# Patient Record
Sex: FEMALE | Race: WHITE | NOT HISPANIC OR LATINO | URBAN - METROPOLITAN AREA
[De-identification: names, ages, dates, MRNs, and addresses within clinical notes are randomized per-mention and may not be internally consistent; named-entity substitution may affect disease eponyms.]

---

## 2021-12-07 ENCOUNTER — EMERGENCY (EMERGENCY)
Facility: HOSPITAL | Age: 51
LOS: 1 days | Discharge: HOME | End: 2021-12-07
Admitting: EMERGENCY MEDICINE
Payer: COMMERCIAL

## 2021-12-07 VITALS
HEART RATE: 88 BPM | RESPIRATION RATE: 17 BRPM | HEIGHT: 63 IN | DIASTOLIC BLOOD PRESSURE: 76 MMHG | SYSTOLIC BLOOD PRESSURE: 116 MMHG | OXYGEN SATURATION: 98 % | TEMPERATURE: 99 F | WEIGHT: 119.93 LBS

## 2021-12-07 VITALS
TEMPERATURE: 98 F | HEART RATE: 89 BPM | SYSTOLIC BLOOD PRESSURE: 120 MMHG | DIASTOLIC BLOOD PRESSURE: 73 MMHG | OXYGEN SATURATION: 98 % | RESPIRATION RATE: 16 BRPM

## 2021-12-07 DIAGNOSIS — M54.2 CERVICALGIA: ICD-10-CM

## 2021-12-07 DIAGNOSIS — R55 SYNCOPE AND COLLAPSE: ICD-10-CM
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
## 2021-12-07 DIAGNOSIS — M54.9 DORSALGIA, UNSPECIFIED: ICD-10-CM

## 2021-12-07 DIAGNOSIS — S09.90XA UNSPECIFIED INJURY OF HEAD, INITIAL ENCOUNTER: ICD-10-CM
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
## 2021-12-07 DIAGNOSIS — R51.9 HEADACHE, UNSPECIFIED: ICD-10-CM

## 2021-12-07 LAB
ALBUMIN SERPL ELPH-MCNC: 3.5 G/DL — SIGNIFICANT CHANGE UP (ref 3.4–5)
ALP SERPL-CCNC: 52 U/L — SIGNIFICANT CHANGE UP (ref 40–120)
ALT FLD-CCNC: 20 U/L — SIGNIFICANT CHANGE UP (ref 12–42)
AMPHET UR-MCNC: NEGATIVE — SIGNIFICANT CHANGE UP
ANION GAP SERPL CALC-SCNC: 5 MMOL/L — LOW (ref 9–16)
APPEARANCE UR: CLEAR — SIGNIFICANT CHANGE UP
APTT BLD: 34.5 SEC — SIGNIFICANT CHANGE UP (ref 27.5–35.5)
AST SERPL-CCNC: 23 U/L — SIGNIFICANT CHANGE UP (ref 15–37)
BARBITURATES UR SCN-MCNC: NEGATIVE — SIGNIFICANT CHANGE UP
BASOPHILS # BLD AUTO: 0.06 K/UL — SIGNIFICANT CHANGE UP (ref 0–0.2)
BASOPHILS NFR BLD AUTO: 0.9 % — SIGNIFICANT CHANGE UP (ref 0–2)
BENZODIAZ UR-MCNC: NEGATIVE — SIGNIFICANT CHANGE UP
BILIRUB SERPL-MCNC: 0.7 MG/DL — SIGNIFICANT CHANGE UP (ref 0.2–1.2)
BILIRUB UR-MCNC: NEGATIVE — SIGNIFICANT CHANGE UP
BUN SERPL-MCNC: 15 MG/DL — SIGNIFICANT CHANGE UP (ref 7–23)
CALCIUM SERPL-MCNC: 9.3 MG/DL — SIGNIFICANT CHANGE UP (ref 8.5–10.5)
CHLORIDE SERPL-SCNC: 107 MMOL/L — SIGNIFICANT CHANGE UP (ref 96–108)
CO2 SERPL-SCNC: 27 MMOL/L — SIGNIFICANT CHANGE UP (ref 22–31)
COCAINE METAB.OTHER UR-MCNC: NEGATIVE — SIGNIFICANT CHANGE UP
COLOR SPEC: YELLOW — SIGNIFICANT CHANGE UP
CREAT SERPL-MCNC: 1.05 MG/DL — SIGNIFICANT CHANGE UP (ref 0.5–1.3)
DIFF PNL FLD: NEGATIVE — SIGNIFICANT CHANGE UP
EOSINOPHIL # BLD AUTO: 0.08 K/UL — SIGNIFICANT CHANGE UP (ref 0–0.5)
EOSINOPHIL NFR BLD AUTO: 1.2 % — SIGNIFICANT CHANGE UP (ref 0–6)
ETHANOL SERPL-MCNC: <3 MG/DL — SIGNIFICANT CHANGE UP
GLUCOSE SERPL-MCNC: 101 MG/DL — HIGH (ref 70–99)
GLUCOSE UR QL: NEGATIVE — SIGNIFICANT CHANGE UP
HCG SERPL-ACNC: 1 MIU/ML — SIGNIFICANT CHANGE UP
HCT VFR BLD CALC: 41.9 % — SIGNIFICANT CHANGE UP (ref 34.5–45)
HGB BLD-MCNC: 14.8 G/DL — SIGNIFICANT CHANGE UP (ref 11.5–15.5)
IMM GRANULOCYTES NFR BLD AUTO: 0.3 % — SIGNIFICANT CHANGE UP (ref 0–1.5)
INR BLD: 1.02 — SIGNIFICANT CHANGE UP (ref 0.88–1.16)
KETONES UR-MCNC: NEGATIVE — SIGNIFICANT CHANGE UP
LEUKOCYTE ESTERASE UR-ACNC: NEGATIVE — SIGNIFICANT CHANGE UP
LYMPHOCYTES # BLD AUTO: 2.08 K/UL — SIGNIFICANT CHANGE UP (ref 1–3.3)
LYMPHOCYTES # BLD AUTO: 32.4 % — SIGNIFICANT CHANGE UP (ref 13–44)
MAGNESIUM SERPL-MCNC: 2.1 MG/DL — SIGNIFICANT CHANGE UP (ref 1.6–2.6)
MCHC RBC-ENTMCNC: 31.5 PG — SIGNIFICANT CHANGE UP (ref 27–34)
MCHC RBC-ENTMCNC: 35.3 GM/DL — SIGNIFICANT CHANGE UP (ref 32–36)
MCV RBC AUTO: 89.1 FL — SIGNIFICANT CHANGE UP (ref 80–100)
METHADONE UR-MCNC: NEGATIVE — SIGNIFICANT CHANGE UP
MONOCYTES # BLD AUTO: 0.5 K/UL — SIGNIFICANT CHANGE UP (ref 0–0.9)
MONOCYTES NFR BLD AUTO: 7.8 % — SIGNIFICANT CHANGE UP (ref 2–14)
NEUTROPHILS # BLD AUTO: 3.67 K/UL — SIGNIFICANT CHANGE UP (ref 1.8–7.4)
NEUTROPHILS NFR BLD AUTO: 57.4 % — SIGNIFICANT CHANGE UP (ref 43–77)
NITRITE UR-MCNC: NEGATIVE — SIGNIFICANT CHANGE UP
NRBC # BLD: 0 /100 WBCS — SIGNIFICANT CHANGE UP (ref 0–0)
OPIATES UR-MCNC: NEGATIVE — SIGNIFICANT CHANGE UP
PCP SPEC-MCNC: SIGNIFICANT CHANGE UP
PCP UR-MCNC: NEGATIVE — SIGNIFICANT CHANGE UP
PH UR: 7 — SIGNIFICANT CHANGE UP (ref 5–8)
PLATELET # BLD AUTO: 264 K/UL — SIGNIFICANT CHANGE UP (ref 150–400)
POTASSIUM SERPL-MCNC: 4.2 MMOL/L — SIGNIFICANT CHANGE UP (ref 3.5–5.3)
POTASSIUM SERPL-SCNC: 4.2 MMOL/L — SIGNIFICANT CHANGE UP (ref 3.5–5.3)
PROT SERPL-MCNC: 6.7 G/DL — SIGNIFICANT CHANGE UP (ref 6.4–8.2)
PROT UR-MCNC: NEGATIVE MG/DL — SIGNIFICANT CHANGE UP
PROTHROM AB SERPL-ACNC: 12 SEC — SIGNIFICANT CHANGE UP (ref 10.6–13.6)
RBC # BLD: 4.7 M/UL — SIGNIFICANT CHANGE UP (ref 3.8–5.2)
RBC # FLD: 12.5 % — SIGNIFICANT CHANGE UP (ref 10.3–14.5)
SODIUM SERPL-SCNC: 139 MMOL/L — SIGNIFICANT CHANGE UP (ref 132–145)
SP GR SPEC: 1.02 — SIGNIFICANT CHANGE UP (ref 1–1.03)
THC UR QL: NEGATIVE — SIGNIFICANT CHANGE UP
TROPONIN I, HIGH SENSITIVITY RESULT: 5.8 NG/L — SIGNIFICANT CHANGE UP
UROBILINOGEN FLD QL: 0.2 E.U./DL — SIGNIFICANT CHANGE UP
WBC # BLD: 6.41 K/UL — SIGNIFICANT CHANGE UP (ref 3.8–10.5)
WBC # FLD AUTO: 6.41 K/UL — SIGNIFICANT CHANGE UP (ref 3.8–10.5)

## 2021-12-07 PROCEDURE — 93010 ELECTROCARDIOGRAM REPORT: CPT

## 2021-12-07 PROCEDURE — 70450 CT HEAD/BRAIN W/O DYE: CPT | Mod: 26

## 2021-12-07 PROCEDURE — 99285 EMERGENCY DEPT VISIT HI MDM: CPT | Mod: 25

## 2021-12-07 PROCEDURE — 72125 CT NECK SPINE W/O DYE: CPT | Mod: 26

## 2021-12-07 RX ORDER — SODIUM CHLORIDE 9 MG/ML
1000 INJECTION INTRAMUSCULAR; INTRAVENOUS; SUBCUTANEOUS ONCE
Refills: 0 | Status: COMPLETED | OUTPATIENT
Start: 2021-12-07 | End: 2021-12-07

## 2021-12-07 RX ORDER — METOCLOPRAMIDE HCL 10 MG
10 TABLET ORAL ONCE
Refills: 0 | Status: COMPLETED | OUTPATIENT
Start: 2021-12-07 | End: 2021-12-07

## 2021-12-07 RX ADMIN — SODIUM CHLORIDE 1000 MILLILITER(S): 9 INJECTION INTRAMUSCULAR; INTRAVENOUS; SUBCUTANEOUS at 12:48

## 2021-12-07 RX ADMIN — SODIUM CHLORIDE 1000 MILLILITER(S): 9 INJECTION INTRAMUSCULAR; INTRAVENOUS; SUBCUTANEOUS at 12:55

## 2021-12-07 NOTE — ED ADULT TRIAGE NOTE - CHIEF COMPLAINT QUOTE
syncopal episode last night +loc  c/o headache and nausea. headache relieved with ibuprofen pta. "I went out to walk my dog and as I was waiting for my friend I told her I wasn't feeling well and I was on the ground." upon onset of symptoms states that she was diaphoretic. denies any chest pain/sob. currently aox3 verbal, ambulatory with steady gait. yu. denies any cardiac hx.

## 2021-12-07 NOTE — ED PROVIDER NOTE - PATIENT PORTAL LINK FT
You can access the FollowMyHealth Patient Portal offered by [facility] by registering at the following website: [email or web address]. By joining [employer]’s FollowMyHealth portal, you will also be able to view your health information using other applications (apps) compatible with our system.

## 2021-12-07 NOTE — ED PROVIDER NOTE - OBJECTIVE STATEMENT
52 y/o female with PMHx of migraines (on no medication), presents to the ED s/p syncopal episode yesterday. Patient states that she was hosting a friend last night at her house and was about to go out ot walk her dog, and when she stepped outside she became dizzy and passed out just outside her house. Patient states her friend witnessed the event and helped her into the house. Patient states the event lasted "seconds", and when she came to, she heard her friend's voice before regaining vision. Patient states that after the event the dizziness completely resolved but she had a headache, so she took an Advil and waited 1 hour before going to bed out of concern that sleeping soon after a concussion could be problematic. Patient states she woke up in the middle of the night with a headache, neck pain and nausea but with no vomiting. Today, patient states the headache is still present, and she feels slightly dizzy and "a little off". Patiet denies chest pain, shortness of breath, cough, palpitations, fever or chills. She denies change in appetite, adding that she "doesn't eat a lot normally." Patient states that her home is hot and dry because of the heater in the winter time, and she states she may not drink enough water because she has a demanding job. Patient has FMHx of cardiac issues: her father had an emergency tripel bypass just before he turned 50 years old. Patient states this is the first time she has had a syncopal episode, but recalls one event in the past where she felt she nearly passed out after taking a hot shower. 52 y/o female with PMHx of migraines (on no medication), presents to the ED s/p syncopal episode yesterday. Patient states that she was hosting a friend last night at her house and was about to go out ot walk her dog, and when she stepped outside she became dizzy and passed out just outside her house. Patient states her friend witnessed the event and helped her into the house. Patient states the event lasted "seconds", and when she came to, she heard her friend's voice before regaining vision. Patient states that after the event the dizziness completely resolved but she had a headache, so she took an Advil and waited 1 hour before going to bed out of concern that sleeping soon after a concussion could be problematic. Patient states she woke up in the middle of the night with a headache, neck pain and nausea but with no vomiting. Today, patient states the headache is still present, and she feels slightly dizzy and "a little off". Patient denies chest pain, shortness of breath, cough, palpitations, fever or chills. She denies change in appetite, adding that she "doesn't eat a lot normally." Patient states that her home is hot and dry because of the heater in the winter time, and she states she may not drink enough water because she has a demanding job. Patient has FMHx of cardiac issues: her father had an emergency tripel bypass just before he turned 50 years old. Patient states this is the first time she has had a syncopal episode, but recalls one event in the past where she felt she nearly passed out after taking a hot shower. Patient is vaccinated against COVID-19 with booster shot. Patient states she has regular menstrual periods, and her LMP ended 8 days ago. 50 y/o female with PMHx of migraines (on no medication), presents to the ED s/p syncopal episode yesterday. Patient states that she was hosting a friend last night at her house and was about to go out ot walk her dog, and when she stepped outside she became dizzy and passed out just outside her house. Patient states her friend witnessed the event and helped her into the house. Patient states the event lasted "seconds".  When she came to, she heard her friend's voice before regaining vision. After the event, the dizziness completely resolved but she had a generalized headache.  She took an Advil and waited 1 hour before going to bed.  She noted a headache, neck pain and nausea but with no vomiting in the middle of the night. Today, patient states the headache is still present but less intense with associated dizziness dizzy and feels "a little off". She also c/o upper back /neck pain.    Patient denies chest pain, shortness of breath, cough, palpitations, abd pain, n/v/d/ fever or chills, recent illness, numbness/weakness, visual changes.   Pt admits to poor eating habits and decreased PO intake.  Patient has FMHx of "cardiac issues": her father had an emergency tripel bypass just before he turned 50 years old. Patient states this is the first time she has had a syncopal episode, but recalls one event in the past where she felt she nearly passed out after taking a hot shower. Patient is vaccinated against COVID-19 with booster shot. Patient states she has regular menstrual periods, and her LMP ended 8 days ago.

## 2021-12-07 NOTE — ED PROVIDER NOTE - ENMT, MLM
Airway patent, NCAT Airway patent, Nasal mucosa clear. Mouth with normal mucosa. Throat has no vesicles, no oropharyngeal exudates and uvula is midline.

## 2021-12-07 NOTE — ED ADULT NURSE NOTE - NSIMPLEMENTINTERV_GEN_ALL_ED
Implemented All Fall Risk Interventions:  [place] to call system. Call bell, personal items and telephone within reach. Instruct patient to call for assistance. Room bathroom lighting operational. Non-slip footwear when patient is off stretcher. Physically safe environment: no spills, clutter or unnecessary equipment. Stretcher in lowest position, wheels locked, appropriate side rails in place. Provide visual cue, wrist band, yellow gown, etc. Monitor gait and stability. Monitor for mental status changes and reorient to person, place, and time. Review medications for side effects contributing to fall risk. Reinforce activity limits and safety measures with patient and family.

## 2021-12-07 NOTE — ED ADULT NURSE NOTE - OBJECTIVE STATEMENT
52 y/o female who states she passed out last night at 10pm and hit her head on the terra cotta floor- Pt reports slight pain to back of head and lateral neck pain- Pt admits to only having 1 alcoholic drink last night- but is concerned for concussion

## 2021-12-07 NOTE — ED PROVIDER NOTE - NSFOLLOWUPINSTRUCTIONS_ED_ALL_ED_FT
HEAD INJURY    A head injury can include your scalp, face, skull, or brain and range from mild to severe. Effects can appear immediately after the injury or develop later. The effects may last a short time or be permanent. Healthcare providers may want to check your recovery over time. Treatment may change as you recover or develop new health problems from the head injury.    DISCHARGE INSTRUCTIONS:    Call your local emergency number (911 in the [place]), or have someone else call if:   •You cannot be woken.  •You have a seizure.  •You stop responding to others or you faint.  •You have blurry or double vision.  •Your speech becomes slurred or confused.  •You have arm or leg weakness, loss of feeling, or new problems with coordination.  •Your pupils are larger than usual, or one pupil is a different size than the other.  •You have blood or clear fluid coming out of your ears or nose.      Return to the emergency department if:   •You have repeated or forceful vomiting.  •You feel confused.  •Your headache gets worse or becomes severe.  •You or someone caring for you notices that you are harder to wake than usual.      Call your doctor if:   •Your symptoms last longer than 6 weeks after the injury.  •You have questions or concerns about your condition or care.      Medicines:   •Acetaminophen decreases pain and fever. It is available without a doctor's order. Ask how much to take and how often to take it. Follow directions. Read the labels of all other medicines you are using to see if they also contain acetaminophen, or ask your doctor or pharmacist. Acetaminophen can cause liver damage if not taken correctly. Do not use more than 4 grams (4,000 milligrams) total of acetaminophen in one day.       •Take your medicine as directed. Contact your healthcare provider if you think your medicine is not helping or if you have side effects. Tell him or her if you are allergic to any medicine. Keep a list of the medicines, vitamins, and herbs you take. Include the amounts, and when and why you take them. Bring the list or the pill bottles to follow-up visits. Carry your medicine list with you in case of an emergency.      Self-care:   •Rest or do quiet activities. Limit your time watching TV, using the computer, or doing tasks that require a lot of thinking. Slowly return to your normal activities as directed. Do not play sports or do activities that may cause you to get hit in the head. Ask your healthcare provider when you can return to sports.      •Apply ice on your head for 15 to 20 minutes every hour or as directed. Use an ice pack, or put crushed ice in a plastic bag. Cover it with a towel before you apply it to your skin. Ice helps prevent tissue damage and decreases swelling and pain.      •Have someone stay with you for 24 hours , or as directed. This person can monitor you for problems and call for help if needed. When you are awake, the person should ask you a few questions every few hours to see if you are thinking clearly. An example is to ask your name or address.      Prevent another head injury:   •Wear a helmet that fits properly. Do this when you play sports, or ride a bike, scooter, or skateboard. Helmets help decrease your risk for a serious head injury. Talk to your healthcare provider about other ways you can protect yourself if you play sports.      •Wear your seatbelt every time you are in a car. This helps lower your risk for a head injury if you are in a car accident.    Follow up with your doctor as directed: Write down your questions so you remember to ask them during your visits.      Syncope    Syncope is when you temporarily lose consciousness, also called fainting or passing out. It is caused by a sudden decrease in blood flow to the brain. Even though most causes of syncope are not dangerous, syncope can possibly be a sign of a serious medical problem. Signs that you may be about to faint include feeling dizzy, lightheaded, nausea, visual changes, or cold/clammy skin. Do not drive, operate heavy machinery, or play sports until your health care provider says it is okay.    SEEK IMMEDIATE MEDICAL CARE IF YOU HAVE ANY OF THE FOLLOWING SYMPTOMS: severe headache, pain in your chest/abdomen/back, bleeding from your mouth or rectum, palpitations, shortness of breath, pain with breathing, seizure, confusion, or trouble walking.

## 2021-12-07 NOTE — ED PROVIDER NOTE - NEUROLOGICAL, MLM
Alert and oriented, no focal deficits. Patient is alert, oriented x person, place and time.  Cranial nerves 2-12 are intact.  Normal gait and speech.  Cerebellar testing normal:  negative Romberg, normal coordination and normal finger to nose, heal to shin and rapid alternating movements.  Normal proprioception and sensory exam.  No pronator drift.  5/5 bl upper extremity and lower extremity strength.

## 2021-12-07 NOTE — ED PROVIDER NOTE - CLINICAL SUMMARY MEDICAL DECISION MAKING FREE TEXT BOX
52 y/o female with PMHx of migraines presents to the ED s/p syncopal episode yesterday evening. Patient currently presents with lingering headache, neck pain and dizziness. Will get CT head to rule out bleeding. 52 y/o female with PMHx of migraines presents to the ED with c/o of generalized headache and neck/ upper back pain pain s/p syncopal  and fall yesterday.  Pt well appearing, VSS, NAD.  Neuros intact.  CT head and cervical spine negative.  Pt hydrated with IVFs and given Reglan IV.      Results and diagnosis discussed with patient.  Treatment plan discussed.  Return precautions discussed.  Pt verbalized understanding and given the opportunity to ask questions.  Patient advised to follow up with primary care provider and cardiology.

## 2021-12-07 NOTE — ED ADULT NURSE NOTE - NS ED PATIENT SAFETY CONCERN
What Type Of Note Output Would You Prefer (Optional)?: Standard Output
How Severe Is Your Rash?: mild
Is This A New Presentation, Or A Follow-Up?: Rash
No

## 2021-12-07 NOTE — ED PROVIDER NOTE - CARE PROVIDER_API CALL
Efrain Magaña)  Cardiovascular Disease  [address] [facility] [place] [address]  Phone: [phone]  Fax: [phone]  Follow Up Time:

## 2022-04-10 ENCOUNTER — EMERGENCY (EMERGENCY)
Facility: HOSPITAL | Age: 52
LOS: 1 days | Discharge: HOME | End: 2022-04-10
Admitting: EMERGENCY MEDICINE
Payer: COMMERCIAL

## 2022-04-10 VITALS
SYSTOLIC BLOOD PRESSURE: 92 MMHG | HEART RATE: 95 BPM | OXYGEN SATURATION: 98 % | TEMPERATURE: 98 F | DIASTOLIC BLOOD PRESSURE: 52 MMHG | WEIGHT: 125 LBS | RESPIRATION RATE: 18 BRPM | HEIGHT: 63 IN

## 2022-04-10 LAB
ALBUMIN SERPL ELPH-MCNC: 3.5 G/DL — SIGNIFICANT CHANGE UP (ref 3.4–5)
ALP SERPL-CCNC: 55 U/L — SIGNIFICANT CHANGE UP (ref 40–120)
ALT FLD-CCNC: 21 U/L — SIGNIFICANT CHANGE UP (ref 12–42)
ANION GAP SERPL CALC-SCNC: 8 MMOL/L — LOW (ref 9–16)
AST SERPL-CCNC: 24 U/L — SIGNIFICANT CHANGE UP (ref 15–37)
BASOPHILS # BLD AUTO: 0.05 K/UL — SIGNIFICANT CHANGE UP (ref 0–0.2)
BASOPHILS NFR BLD AUTO: 0.4 % — SIGNIFICANT CHANGE UP (ref 0–2)
BILIRUB SERPL-MCNC: 0.6 MG/DL — SIGNIFICANT CHANGE UP (ref 0.2–1.2)
BUN SERPL-MCNC: 20 MG/DL — SIGNIFICANT CHANGE UP (ref 7–23)
CALCIUM SERPL-MCNC: 9 MG/DL — SIGNIFICANT CHANGE UP (ref 8.5–10.5)
CHLORIDE SERPL-SCNC: 104 MMOL/L — SIGNIFICANT CHANGE UP (ref 96–108)
CO2 SERPL-SCNC: 27 MMOL/L — SIGNIFICANT CHANGE UP (ref 22–31)
CREAT SERPL-MCNC: 0.97 MG/DL — SIGNIFICANT CHANGE UP (ref 0.5–1.3)
EGFR: 71 ML/MIN/1.73M2 — SIGNIFICANT CHANGE UP
EOSINOPHIL # BLD AUTO: 0.03 K/UL — SIGNIFICANT CHANGE UP (ref 0–0.5)
EOSINOPHIL NFR BLD AUTO: 0.3 % — SIGNIFICANT CHANGE UP (ref 0–6)
GLUCOSE SERPL-MCNC: 120 MG/DL — HIGH (ref 70–99)
HCT VFR BLD CALC: 41.3 % — SIGNIFICANT CHANGE UP (ref 34.5–45)
HGB BLD-MCNC: 14.4 G/DL — SIGNIFICANT CHANGE UP (ref 11.5–15.5)
IMM GRANULOCYTES NFR BLD AUTO: 0.4 % — SIGNIFICANT CHANGE UP (ref 0–1.5)
LYMPHOCYTES # BLD AUTO: 0.94 K/UL — LOW (ref 1–3.3)
LYMPHOCYTES # BLD AUTO: 8.4 % — LOW (ref 13–44)
MCHC RBC-ENTMCNC: 31.5 PG — SIGNIFICANT CHANGE UP (ref 27–34)
MCHC RBC-ENTMCNC: 34.9 GM/DL — SIGNIFICANT CHANGE UP (ref 32–36)
MCV RBC AUTO: 90.4 FL — SIGNIFICANT CHANGE UP (ref 80–100)
MONOCYTES # BLD AUTO: 0.77 K/UL — SIGNIFICANT CHANGE UP (ref 0–0.9)
MONOCYTES NFR BLD AUTO: 6.9 % — SIGNIFICANT CHANGE UP (ref 2–14)
NEUTROPHILS # BLD AUTO: 9.33 K/UL — HIGH (ref 1.8–7.4)
NEUTROPHILS NFR BLD AUTO: 83.6 % — HIGH (ref 43–77)
NRBC # BLD: 0 /100 WBCS — SIGNIFICANT CHANGE UP (ref 0–0)
NT-PROBNP SERPL-SCNC: 29 PG/ML — SIGNIFICANT CHANGE UP
PLATELET # BLD AUTO: 226 K/UL — SIGNIFICANT CHANGE UP (ref 150–400)
POTASSIUM SERPL-MCNC: 3.9 MMOL/L — SIGNIFICANT CHANGE UP (ref 3.5–5.3)
POTASSIUM SERPL-SCNC: 3.9 MMOL/L — SIGNIFICANT CHANGE UP (ref 3.5–5.3)
PROT SERPL-MCNC: 6.4 G/DL — SIGNIFICANT CHANGE UP (ref 6.4–8.2)
RBC # BLD: 4.57 M/UL — SIGNIFICANT CHANGE UP (ref 3.8–5.2)
RBC # FLD: 12.7 % — SIGNIFICANT CHANGE UP (ref 10.3–14.5)
SODIUM SERPL-SCNC: 139 MMOL/L — SIGNIFICANT CHANGE UP (ref 132–145)
TROPONIN I, HIGH SENSITIVITY RESULT: 6.2 NG/L — SIGNIFICANT CHANGE UP
WBC # BLD: 11.16 K/UL — HIGH (ref 3.8–10.5)
WBC # FLD AUTO: 11.16 K/UL — HIGH (ref 3.8–10.5)

## 2022-04-10 PROCEDURE — 99284 EMERGENCY DEPT VISIT MOD MDM: CPT | Mod: 25

## 2022-04-10 PROCEDURE — 12011 RPR F/E/E/N/L/M 2.5 CM/<: CPT

## 2022-04-10 PROCEDURE — 93010 ELECTROCARDIOGRAM REPORT: CPT | Mod: 59

## 2022-04-10 RX ORDER — SODIUM CHLORIDE 9 MG/ML
1000 INJECTION INTRAMUSCULAR; INTRAVENOUS; SUBCUTANEOUS ONCE
Refills: 0 | Status: COMPLETED | OUTPATIENT
Start: 2022-04-10 | End: 2022-04-10

## 2022-04-10 RX ADMIN — SODIUM CHLORIDE 1000 MILLILITER(S): 9 INJECTION INTRAMUSCULAR; INTRAVENOUS; SUBCUTANEOUS at 22:53

## 2022-04-10 NOTE — ED ADULT NURSE NOTE - OBJECTIVE STATEMENT
Pt presents to ed for syncope episode tonight. went for a run this morning, doesn't believe she drank enough water, and came back to apt. felt light headed, sat down and it was resolved. pt felt better so got into elevator, and felt lightheaded again and proceeded to syncopize. + LOC for a few seconds, woke up with hematoma to left forehead and small laceration to left forehead  bleeding controlled

## 2022-04-10 NOTE — ED ADULT TRIAGE NOTE - CHIEF COMPLAINT QUOTE
Pt. walk in. Pt. fainted and hit head. "I think I'm dehydrated, I went for a hike today and I drank a lot of water but not enough. I fainted in the elevator after walking my dog." Pt. has laceration to L. forehead. Denies blood thinners.

## 2022-04-11 VITALS
TEMPERATURE: 99 F | OXYGEN SATURATION: 99 % | SYSTOLIC BLOOD PRESSURE: 98 MMHG | RESPIRATION RATE: 18 BRPM | HEART RATE: 102 BPM | DIASTOLIC BLOOD PRESSURE: 56 MMHG

## 2022-04-11 PROBLEM — G43.909 MIGRAINE, UNSPECIFIED, NOT INTRACTABLE, WITHOUT STATUS MIGRAINOSUS: Chronic | Status: ACTIVE | Noted: 2021-12-07

## 2022-04-11 LAB
APPEARANCE UR: CLEAR — SIGNIFICANT CHANGE UP
BACTERIA # UR AUTO: ABNORMAL /HPF
BILIRUB UR-MCNC: NEGATIVE — SIGNIFICANT CHANGE UP
COLOR SPEC: YELLOW — SIGNIFICANT CHANGE UP
COMMENT - URINE: SIGNIFICANT CHANGE UP
DIFF PNL FLD: ABNORMAL
EPI CELLS # UR: ABNORMAL /HPF (ref 0–5)
GLUCOSE UR QL: NEGATIVE — SIGNIFICANT CHANGE UP
KETONES UR-MCNC: 15 MG/DL
LEUKOCYTE ESTERASE UR-ACNC: ABNORMAL
NITRITE UR-MCNC: NEGATIVE — SIGNIFICANT CHANGE UP
PH UR: 7 — SIGNIFICANT CHANGE UP (ref 5–8)
PROT UR-MCNC: NEGATIVE MG/DL — SIGNIFICANT CHANGE UP
SP GR SPEC: 1.02 — SIGNIFICANT CHANGE UP (ref 1–1.03)
UROBILINOGEN FLD QL: 0.2 E.U./DL — SIGNIFICANT CHANGE UP
WBC UR QL: > 10 /HPF

## 2022-04-11 RX ORDER — CEFUROXIME AXETIL 250 MG
1 TABLET ORAL
Qty: 10 | Refills: 0
Start: 2022-04-11 | End: 2022-04-15

## 2022-04-11 RX ORDER — CEFUROXIME AXETIL 250 MG
250 TABLET ORAL ONCE
Refills: 0 | Status: COMPLETED | OUTPATIENT
Start: 2022-04-11 | End: 2022-04-11

## 2022-04-11 RX ADMIN — Medication 250 MILLIGRAM(S): at 00:30

## 2022-04-11 NOTE — ED PROVIDER NOTE - MUSCULOSKELETAL, MLM
Spine appears normal with no midline tenderness or stepoff deformity, range of motion is not limited, no muscle or joint tenderness

## 2022-04-11 NOTE — ED PROVIDER NOTE - OBJECTIVE STATEMENT
50yo F presents with c/o syncope. states she was out hiking all day and does not think she hydrated enough, then she was walking her dog and when she came back she felt lightheaded and experienced tunnel vision. she sat down and then passed out, striking her forehead. she describes mild headache. no n/v, numbness, tingling, weakness, dizziness, neck or back pain, vision changes, speech changes, cp, sob, palpitations. one similar episode approx 6 months ago.

## 2022-04-11 NOTE — ED PROVIDER NOTE - PATIENT PORTAL LINK FT
You can access the FollowMyHealth Patient Portal offered by [facility] by registering at the following website: [email or web address]. By joining [employer]’s FollowMyHealth portal, you will also be able to view your health information using other applications (apps) compatible with our system.

## 2022-04-11 NOTE — ED PROVIDER NOTE - CLINICAL SUMMARY MEDICAL DECISION MAKING FREE TEXT BOX
pt presents with syncope with facial lac in the setting of suspected dehyration. not orthostatic but mildly hypotensive with ketonuria. given IVF with improvement. cardiac enzymes negative. UA with UTI. given abx. will d/c.

## 2022-04-11 NOTE — ED PROVIDER NOTE - ENMT, MLM
Airway patent, Nasal mucosa clear. Mouth with normal mucosa. Throat has no vesicles, no oropharyngeal exudates and uvula is midline. + linear laceration to forehead with minimal subQ fat visible and surrounding hematoma

## 2022-04-11 NOTE — ED PROVIDER NOTE - NSFOLLOWUPINSTRUCTIONS_ED_ALL_ED_FT
DRINK PLENTY OF FLUIDS FOR THE NEXT 48 HOURS ESPECIALLY.     TAKE ANTIBIOTICS AS PRESCRIBED FOR UTI.     FOLLOW UP WITH CARDIOLOGIST AS THIS IS NOW THE SECOND TIME YOU HAVE PASSED OUT.     KEEP WOUND CLEAN AND DRY FOR 24 HOURS. AFTER THAT OKAY TO SHOWER NORMALLY BUT DO NOT SUBMERGE OR SCRUB THE WOUND. CHANGE DRESSING EVERY 1-2 DAYS.     RETURN FOR SUTURE REMOVAL IN 5-7 DAYS.     Urinary Tract Infection    A urinary tract infection (UTI) is an infection of any part of the urinary tract, which includes the kidneys, ureters, bladder, and urethra. Risk factors include ignoring your need to urinate, wiping back to front if female, being an uncircumcised male, and having diabetes or a weak immune system. Symptoms include frequent urination, pain or burning with urination, foul smelling urine, cloudy urine, pain in the lower abdomen, blood in the urine, and fever. If you were prescribed an antibiotic medicine, take it as told by your health care provider. Do not stop taking the antibiotic even if you start to feel better.    SEEK IMMEDIATE MEDICAL CARE IF YOU HAVE ANY OF THE FOLLOWING SYMPTOMS: severe back or abdominal pain, fever, inability to keep fluids or medicine down, dizziness/lightheadedness, or a change in mental status.     Syncope    WHAT YOU NEED TO KNOW:    Syncope is also called fainting or passing out. Syncope is a sudden, temporary loss of consciousness, followed by a fall from a standing or sitting position. Syncope ranges from not serious to a sign of a more serious condition that needs to be treated. You can control some health conditions that cause syncope. Your healthcare providers can help you create a plan to manage syncope and prevent episodes.    DISCHARGE INSTRUCTIONS:    Seek care immediately if:     You are bleeding because you hit your head when you fainted.       You suddenly have double vision, difficulty speaking, numbness, and cannot move your arms or legs.      You have chest pain and trouble breathing.      You vomit blood or material that looks like coffee grounds.      You see blood in your bowel movement.    Contact your healthcare provider if:     You have new or worsening symptoms.      You have another syncope episode.      You have a headache, fast heartbeat, or feel too dizzy to stand up.      You have questions or concerns about your condition or care.    Medicines:     Medicines may be needed to help your heart pump strongly and regularly. Your healthcare provider may also make changes to any medicines that are causing syncope.       Take your medicine as directed. Contact your healthcare provider if you think your medicine is not helping or if you have side effects. Tell him or her if you are allergic to any medicine. Keep a list of the medicines, vitamins, and herbs you take. Include the amounts, and when and why you take them. Bring the list or the pill bottles to follow-up visits. Carry your medicine list with you in case of an emergency.    Follow up with your healthcare provider as directed: Write down your questions so you remember to ask them during your visits.     Manage syncope:     Keep a record of your syncope episodes. Include your symptoms and your activity before and after the episode. The record can help your healthcare provider find the cause of your syncope and help you manage episodes.      Sit or lie down when needed. This includes when you feel dizzy, your throat is getting tight, and your vision changes. Raise your legs above the level of your heart.      Take slow, deep breaths if you start to breathe faster with anxiety or fear. This can help decrease dizziness and the feeling that you might faint.       Check your blood pressure often. This is important if you take medicine to lower your blood pressure. Check your blood pressure when you are lying down and when you are standing. Ask how often to check during the day. Keep a record of your blood pressure numbers. Your healthcare provider may use the record to help plan your treatment.How to take a Blood Pressure         Prevent a syncope episode:     Move slowly and let yourself get used to one position before you move to another position. This is very important when you change from a lying or sitting position to a standing position. Take some deep breaths before you stand up from a lying position. Stand up slowly. Sudden movements may cause a fainting spell. Sit on the side of the bed or couch for a few minutes before you stand up. If you are on bedrest, try to be upright for about 2 hours each day, or as directed. Do not lock your legs if you are standing for a long period of time. Move your legs and bend your knees to keep blood flowing.      Follow your healthcare provider's recommendations. Your provider may recommend that you drink more liquids to prevent dehydration. You may also need to have more salt to keep your blood pressure from dropping too low and causing syncope. Your provider will tell you how much liquid and sodium to have each day. He or she will also tell you how much physical activity is safe for you. This will depend on what is causing your syncope.      Watch for signs of low blood sugar. These include hunger, nervousness, sweating, and fast or fluttery heartbeats. Talk with your healthcare provider about ways to keep your blood sugar level steady.      Do not strain if you are constipated. You may faint if you strain to have a bowel movement. Walking is the best way to get your bowels moving. Eat foods high in fiber to make it easier to have a bowel movement. Good examples are high-fiber cereals, beans, vegetables, and whole-grain breads. Prune juice may help make bowel movements softer.      Be careful in hot weather. Heat can cause a syncope episode. Limit activity done outside on hot days. Physical activity in hot weather can lead to dehydration. This can cause an episode.    Laceration    A laceration is a cut that goes through all of the layers of the skin and into the tissue that is right under the skin. Some lacerations heal on their own. Others need to be closed with skin adhesive strips, skin glue, stitches (sutures), or staples. Proper laceration care minimizes the risk of infection and helps the laceration to heal better.  If non-absorbable stitches or staples have been placed, they must be taken out within the time frame instructed by your healthcare provider.    SEEK IMMEDIATE MEDICAL CARE IF YOU HAVE ANY OF THE FOLLOWING SYMPTOMS: swelling around the wound, worsening pain, drainage from the wound, red streaking going away from your wound, inability to move finger or toe near the laceration, or discoloration of skin near the laceration.

## 2022-04-11 NOTE — ED PROVIDER NOTE - CARE PROVIDER_API CALL
Efrain Magaña)  Cardiovascular Disease  [address] [facility] [email or web address] [place] [address]  Phone: [phone]  Fax: [phone]  Follow Up Time:

## 2022-04-12 LAB
CULTURE RESULTS: SIGNIFICANT CHANGE UP
SPECIMEN SOURCE: SIGNIFICANT CHANGE UP
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
## 2022-04-14 DIAGNOSIS — S01.81XA LACERATION WITHOUT FOREIGN BODY OF OTHER PART OF HEAD, INITIAL ENCOUNTER: ICD-10-CM
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
## 2022-04-14 DIAGNOSIS — R55 SYNCOPE AND COLLAPSE: ICD-10-CM
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
## 2022-04-16 ENCOUNTER — EMERGENCY (EMERGENCY)
Facility: HOSPITAL | Age: 52
LOS: 1 days | Discharge: HOME | End: 2022-04-16
Attending: EMERGENCY MEDICINE | Admitting: EMERGENCY MEDICINE
Payer: COMMERCIAL

## 2022-04-16 VITALS
OXYGEN SATURATION: 96 % | TEMPERATURE: 98 F | RESPIRATION RATE: 16 BRPM | SYSTOLIC BLOOD PRESSURE: 117 MMHG | WEIGHT: 125 LBS | HEART RATE: 98 BPM | HEIGHT: 63 IN | DIASTOLIC BLOOD PRESSURE: 84 MMHG

## 2022-04-16 DIAGNOSIS — S01.81XD LACERATION WITHOUT FOREIGN BODY OF OTHER PART OF HEAD, SUBSEQUENT ENCOUNTER: ICD-10-CM
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
## 2022-04-16 PROCEDURE — L9995: CPT

## 2022-04-16 NOTE — ED PROVIDER NOTE - SKIN, MLM
4 sutures removed from forehead without complications. No signs of infection. Wound is healing well. 4 sutures removed from forehead without complications. Mild bleeding from area. No signs of infection.

## 2022-04-16 NOTE — ED PROVIDER NOTE - CONSTITUTIONAL NEGATIVE STATEMENT, MLM
no fever and no chills. “You can access the FollowHealth Patient Portal, offered by [facility], by registering with the following website: [email or web address]”

## 2022-04-16 NOTE — ED PROVIDER NOTE - PATIENT PORTAL LINK FT
You can access the FollowMyHealth Patient Portal offered by [facility] by registering at the following website: [email or web address]. By joining [employer]’s FollowMyHealth portal, you will also be able to view your health information using other applications (apps) compatible with our system.

## 2022-04-16 NOTE — ED PROVIDER NOTE - CLINICAL SUMMARY MEDICAL DECISION MAKING FREE TEXT BOX
Patient presenting for suture removal to forehead s/p fall in elevator 11 days ago. 4 sutures removed without complications. See procedure note for details. No signs of infection. Stable for DC. Patient presenting for suture removal to forehead s/p fall in elevator 11 days ago. 4 sutures removed without complications. Mild blood from site. See procedure note for details. No signs of infection. Counseled on scar prevention and treatment. Stable for DC.

## 2022-04-16 NOTE — ED ADULT TRIAGE NOTE - CHIEF COMPLAINT QUOTE
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

## 2022-04-16 NOTE — ED PROVIDER NOTE - OBJECTIVE STATEMENT
52 y/o female presenting for suture removal to forehead s/p fall onto face in elevator 11 days ago. Patient had 4 sutures placed at [facility] 6 days ago. Endorses no new medical complaints. Denies redness, pain, swelling, or drainage from suture site.

## 2022-04-16 NOTE — ED PROVIDER NOTE - NSFOLLOWUPINSTRUCTIONS_ED_ALL_ED_FT
Please keep the scar lubricated, use sunscreen.    You can take Arnica to help bruising clear up faster.